# Patient Record
Sex: FEMALE | Race: WHITE | Employment: FULL TIME | URBAN - METROPOLITAN AREA
[De-identification: names, ages, dates, MRNs, and addresses within clinical notes are randomized per-mention and may not be internally consistent; named-entity substitution may affect disease eponyms.]

---

## 2017-01-07 NOTE — TELEPHONE ENCOUNTER
Patient needs a refill of the following  Requested Prescriptions     Pending Prescriptions Disp Refills    Azelastine (ASTEPRO) 0.15 % (205.5 mcg) nasal spray 1 Bottle 12     Si Sprays by Both Nostrils route two (2) times a day.

## 2017-01-08 RX ORDER — AZELASTINE HCL 205.5 UG/1
2 SPRAY NASAL 2 TIMES DAILY
Qty: 1 BOTTLE | Refills: 12 | Status: SHIPPED | OUTPATIENT
Start: 2017-01-08 | End: 2018-01-26 | Stop reason: SDUPTHER

## 2017-01-23 ENCOUNTER — OFFICE VISIT (OUTPATIENT)
Dept: FAMILY MEDICINE CLINIC | Age: 57
End: 2017-01-23

## 2017-01-23 VITALS
HEART RATE: 77 BPM | TEMPERATURE: 97.6 F | HEIGHT: 65 IN | WEIGHT: 146 LBS | DIASTOLIC BLOOD PRESSURE: 83 MMHG | BODY MASS INDEX: 24.32 KG/M2 | SYSTOLIC BLOOD PRESSURE: 130 MMHG | RESPIRATION RATE: 16 BRPM | OXYGEN SATURATION: 100 %

## 2017-01-23 DIAGNOSIS — J45.20 MILD INTERMITTENT ASTHMA WITHOUT COMPLICATION: ICD-10-CM

## 2017-01-23 DIAGNOSIS — R73.01 IMPAIRED FASTING GLUCOSE: ICD-10-CM

## 2017-01-23 DIAGNOSIS — K21.9 GASTROESOPHAGEAL REFLUX DISEASE, ESOPHAGITIS PRESENCE NOT SPECIFIED: ICD-10-CM

## 2017-01-23 DIAGNOSIS — Z00.00 PREVENTATIVE HEALTH CARE: Primary | ICD-10-CM

## 2017-01-23 DIAGNOSIS — E03.9 HYPOTHYROIDISM, ADULT: ICD-10-CM

## 2017-01-23 DIAGNOSIS — E78.00 PURE HYPERCHOLESTEROLEMIA: ICD-10-CM

## 2017-01-23 DIAGNOSIS — K22.5 ZENKER DIVERTICULUM: ICD-10-CM

## 2017-01-23 DIAGNOSIS — R63.5 WEIGHT GAIN: ICD-10-CM

## 2017-01-23 DIAGNOSIS — H61.91 SKIN LESION OF RIGHT EAR: ICD-10-CM

## 2017-01-23 RX ORDER — RANITIDINE 150 MG/1
150 TABLET, FILM COATED ORAL 2 TIMES DAILY
Qty: 180 TAB | Refills: 3 | Status: SHIPPED | OUTPATIENT
Start: 2017-01-23

## 2017-01-23 RX ORDER — MONTELUKAST SODIUM 10 MG/1
10 TABLET ORAL
Qty: 90 TAB | Refills: 4 | Status: SHIPPED | OUTPATIENT
Start: 2017-01-23 | End: 2018-01-23 | Stop reason: SDUPTHER

## 2017-01-23 NOTE — PROGRESS NOTES
[address]  [phone]             Date of visit: 1/23/2017   Subjective:      History obtained from:  the patient.   Erin Ness is a 55 y.o. female who presents today for physical and f/u chronic problem    Does think the increased thyroid dose has helped her energy level  However still having a hard time keeping her weight down  Of course was eating more desserts around the holidays  In the past could stay under 140 when she ate her usual diet  Is walking every day  Sometimes plays tennis or pickle ball  Doing some balance, stretching  No sugary drinks, only fruit juice 2x per week  Likes potatoes, white rice, some bread    Skin lesion behind right ear she wants me to check    Still on atorvastatin but would like to try off it  Not sure of cholesterol numbers before med, was over 200  Runs in the family  Despite eating well, her cholesterol would not come down    Rarely needs albuterol, attacks seemed to be due to living in [place], polluted air    Uses omeprazole pretty much every day, uses either for symptoms or before she eats something that will bring on reflux symptoms  Has not tried zantac  Trying to put off stretching procedure for zenker diverticulum    Coffee in am but no other caffeine usually    Patient Active Problem List    Diagnosis Date Noted    Hyperlipidemia 07/29/2015     Priority: 1 - One    Hypothyroidism, adult 07/29/2015     Priority: 1 - One    Impaired fasting glucose 07/29/2015     Priority: 1 - One    Zenker diverticulum 07/29/2015     Priority: 2 - Two    Asthma, mild intermittent 07/29/2015     Priority: 2 - Two    Preventative health care 11/30/2015    H/O malaria 09/09/2015    GERD (gastroesophageal reflux disease) 07/29/2015    Ovarian cancer ([facility] [address] 07/29/2015    Allergic rhinitis 07/29/2015    Family history of hemochromatosis 07/29/2015    Family history of breast cancer 07/29/2015    Family history of melanoma 07/29/2015    Diverticulum of esophagus, acquired 03/31/2015     Current Outpatient Prescriptions   Medication Sig Dispense Refill    montelukast (SINGULAIR) 10 mg tablet Take 1 Tab by mouth nightly. 90 Tab 4    raNITIdine (ZANTAC) 150 mg tablet Take 1 Tab by mouth two (2) times a day. For GERD 180 Tab 3    Azelastine (ASTEPRO) 0.15 % (205.5 mcg) nasal spray 2 Sprays by Both Nostrils route two (2) times a day. 1 Bottle 12    atorvastatin (LIPITOR) 10 mg tablet TAKE ONE TABLET BY MOUTH AT BEDTIME 30 Tab 11    levothyroxine (SYNTHROID) 112 mcg tablet Take 1 Tab by mouth Daily (before breakfast). Increased dose 90 Tab 1    fluticasone (FLONASE) 50 mcg/actuation nasal spray 2 Sprays by Both Nostrils route daily. 1 Bottle     albuterol (PROVENTIL) 5 mg/mL nebulizer solution 0.5 mL by Nebulization route every four (4) hours as needed for Wheezing. 0.5 mL 0    albuterol (PROVENTIL HFA, VENTOLIN HFA, PROAIR HFA) 90 mcg/actuation inhaler Take 2 Puffs by inhalation every four (4) hours as needed for Wheezing. 1 Inhaler 3    MULTIVITAMIN PO Take  by mouth daily.  GLUCOSAMINE HCL PO Take  by mouth daily.        No Known Allergies  Past Medical History   Diagnosis Date    Allergic rhinitis     Asthma     Cancer ([facility] [address] 2003     OVARIAN, no recurrence; s/p 3 rounds chemo     GERD (gastroesophageal reflux disease)     Malaria     Thyroid disease      Past Surgical History   Procedure Laterality Date    Hx orthopaedic  2002     FX L ANKLE     Hx gyn  2003     REMOVAL OF TUMOR    Hx hysterectomy  2005     bleeding fibroids    Hx appendectomy      Hx endoscopy  2015    Hx colonoscopy  age 53     normal     Family History   Problem Relation Age of Onset    Breast Cancer Mother 79     also melanoma; mom had negative genetic testing    Liver Disease Father      hemochromatosis    Hypertension Brother     Stomach Cancer Paternal Grandfather     Anesth Problems Neg Hx      Social History   Substance Use Topics    Smoking status: Never Smoker    Smokeless tobacco: Never Used    Alcohol use Yes      Comment: RARE      Social History     Social History Narrative    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 General: stated age, well developed, well nourished and in NAD  Neck: supple, symmetrical, trachea midline, no adenopathy and thyroid: not enlarged, symmetric, no tenderness/mass/nodules  Lungs:  clear to auscultation w/o rales, rhonchi, wheezes w/normal effort and no use of accessory muscles of respiration   Heart: regular rate and rhythm, S1, S2 normal, no murmur, click, rub or gallop  Abdomen: soft, nontender  Ext:  No edema noted.    Lymph: no cervical adenopathy appreciated  Skin:  Normal. and no rash or abnormalities other than 8mm light brown stuck on round flat papule behind right ear c/w seborrheic keratosis  Psych: alert and oriented to person, place, time and situation and Speech: appropriate quality, quantity and organization of sentences     Lab Results   Component Value Date/Time    Hemoglobin A1c 6.2 09/29/2016 04:43 PM     Lab Results   Component Value Date/Time    Cholesterol, total 202 11/30/2015 04:08 PM    HDL Cholesterol 71 11/30/2015 04:08 PM    LDL, calculated 94 11/30/2015 04:08 PM    VLDL, calculated 37 11/30/2015 04:08 PM    Triglyceride 186 11/30/2015 04:08 PM     Lab Results   Component Value Date/Time    Sodium 142 09/29/2016 04:43 PM    Potassium 5.1 09/29/2016 04:43 PM    Chloride 98 09/29/2016 04:43 PM    CO2 23 09/29/2016 04:43 PM    Anion gap 5 03/25/2015 11:25 AM    Glucose 93 09/29/2016 04:43 PM    BUN 12 09/29/2016 04:43 PM    Creatinine 0.68 09/29/2016 04:43 PM    BUN/Creatinine ratio 18 09/29/2016 04:43 PM    GFR est  09/29/2016 04:43 PM    GFR est non-AA 99 09/29/2016 04:43 PM    Calcium 10.0 09/29/2016 04:43 PM    Bilirubin, total 0.2 09/29/2016 04:43 PM    ALT 17 09/29/2016 04:43 PM    AST 14 09/29/2016 04:43 PM    Alk. phosphatase 65 09/29/2016 04:43 PM    Protein, total 6.9 09/29/2016 04:43 PM    Albumin 4.8 09/29/2016 04:43 PM    A-G Ratio 2.3 09/29/2016 04:43 PM     Lab Results   Component Value Date/Time    WBC 10.4 09/29/2016 04:43 PM    HGB 13.2 09/29/2016 04:43 PM    HCT 39.3 09/29/2016 04:43 PM    PLATELET 061 46/50/0435 04:43 PM    MCV 95 09/29/2016 04:43 PM     Lab Results   Component Value Date/Time    TSH 3.900 09/29/2016 04:43 PM       Assessment/Plan:       ICD-10-CM ICD-9-CM    1. Preventative health care Z00.00 V70.0    2. Hypothyroidism, adult E03.9 244.9 TSH 3RD GENERATION    feeling more energy on new dose, check tsh today   3. Pure hypercholesterolemia E78.00 272.0     will try off lipitor before next visit so we can see what cholesterol is without it; plan to use new risk calculator to determine need for statin   4. Gastroesophageal reflux disease, esophagitis presence not specified K21.9 530.81     going to try zantac instead of prn (most day) omeprazole, will let me know if that doesn't work, seeking to lower risk of side effects   5. Skin lesion of right ear L98.9 709.9     seborrheic keratosis, she was reassured   6. Impaired fasting glucose R73.01 790.21     plan to recheck next visit   7. Zenker diverticulum K22.5 530.6     swallowing ok now but expects to need another procedure at some point   8. Weight gain R63.5 783.1     she is working hard on this, BMI technically normal but would like to lose 8-10lb, discussed diet/exercise   9.  Mild intermittent asthma without complication U64.75 753.41     not a problem since she left [place]       Orders Placed This Encounter    TSH 3RD GENERATION    montelukast (SINGULAIR) 10 mg tablet    raNITIdine (ZANTAC) 150 mg tablet       PREVENTIVE  Mammogram: normal 12/16 (3D)  Pap smear:  Always normal and s/p hysterectomy (benign)  Lipid panel:  Good on lipitor 11/15, going to try off it and recheck next visit  Cardiac:  EKG in chart  Bone density:  Due at 66  Colonoscopy:  Normal at age 53  HIV: neg 11/15  Hep C: neg 11/15  Immunizations: has had numerous for travel; pneumovax 2015  Discussed the diagnosis and plan and she expressed understanding. Follow-up Disposition:  Return in about 6 months (around 7/23/2017).     Rabia Mckee MD

## 2017-01-23 NOTE — MR AVS SNAPSHOT
Visit Information Date & Time Provider Department Dept. Phone Encounter #  
 1/23/2017  8:15 AM Geneva Denny, Shani Porras [phone] [id number] Follow-up Instructions Return in about 6 months (around 7/23/2017). Upcoming Health Maintenance Date Due Pneumococcal 19-64 Highest Risk (2 of 3 - PCV13) 9/29/2017 BREAST CANCER SCRN MAMMOGRAM 12/6/2018 DTaP/Tdap/Td series (2 - Td) 11/26/2019 COLONOSCOPY 7/16/2020 Allergies as of 1/23/2017  Review Complete On: 1/23/2017 By: Geneva Denny MD  
 No Known Allergies Current Immunizations  Reviewed on 9/29/2016 Name Date Cholera Vaccine 8/11/1997 Hep A Vaccine (Adult) 8/11/1997 Hep B Vaccine 10/11/2001 Influenza Vaccine (Quad) PF 9/29/2016 Meningococcal (MCV4P) Vaccine 4/4/2013 OPV 8/11/1997 Pneumococcal Polysaccharide (PPSV-23) 9/29/2016 Tdap 11/26/2009 Typhoid Vaccine, Parenteral, Acetone-Killed, Dried ([employer]) 5/9/2014, 3/10/2010 Yellow Fever Vaccine 7/25/2006, 12/2/1996 Not reviewed this visit You Were Diagnosed With   
  
 Codes Comments Preventative health care    -  Primary ICD-10-CM: Z00.00 ICD-9-CM: V70.0 Hypothyroidism, adult     ICD-10-CM: E03.9 ICD-9-CM: 244.9 Pure hypercholesterolemia     ICD-10-CM: E78.00 ICD-9-CM: 272.0 Skin lesion of right ear     ICD-10-CM: L98.9 ICD-9-CM: 709.9 Impaired fasting glucose     ICD-10-CM: R73.01 
ICD-9-CM: 790.21 Zenker diverticulum     ICD-10-CM: K22.5 ICD-9-CM: 530.6 Vitals BP Pulse Temp Resp Height(growth percentile) Weight(growth percentile) 130/83 77 97.6 °F (36.4 °C) (Oral) 16 5' 5\" (1.651 m) 146 lb (66.2 kg) SpO2 BMI OB Status Smoking Status 100% 24.3 kg/m2 Hysterectomy Never Smoker Vitals History BMI and BSA Data Body Mass Index Body Surface Area  
 24.3 kg/m 2 1.74 m 2 Preferred Pharmacy Pharmacy Name Phone [address] Hamzah [facility]ebbles Velasquez [phone] Your Updated Medication List  
  
   
This list is accurate as of: 1/23/17  8:47 AM.  Always use your most recent med list.  
  
  
  
  
 * albuterol 5 mg/mL nebulizer solution Commonly known as:  PROVENTIL  
0.5 mL by Nebulization route every four (4) hours as needed for Wheezing. * albuterol 90 mcg/actuation inhaler Commonly known as:  PROVENTIL HFA, VENTOLIN HFA, PROAIR HFA Take 2 Puffs by inhalation every four (4) hours as needed for Wheezing. atorvastatin 10 mg tablet Commonly known as:  LIPITOR  
TAKE ONE TABLET BY MOUTH AT BEDTIME Azelastine 0.15 % (205.5 mcg) nasal spray Commonly known as:  ASTEPRO  
2 Sprays by Both Nostrils route two (2) times a day. fluticasone 50 mcg/actuation nasal spray Commonly known as:  Hameed Blizzard 2 Sprays by Both Nostrils route daily. GLUCOSAMINE HCL PO Take  by mouth daily. levothyroxine 112 mcg tablet Commonly known as:  SYNTHROID Take 1 Tab by mouth Daily (before breakfast). Increased dose  
  
 montelukast 10 mg tablet Commonly known as:  SINGULAIR Take 1 Tab by mouth nightly. MULTIVITAMIN PO Take  by mouth daily. raNITIdine 150 mg tablet Commonly known as:  ZANTAC Take 1 Tab by mouth two (2) times a day. For GERD * Notice: This list has 2 medication(s) that are the same as other medications prescribed for you. Read the directions carefully, and ask your doctor or other care provider to review them with you. Prescriptions Sent to Pharmacy Refills  
 montelukast (SINGULAIR) 10 mg tablet 4 Sig: Take 1 Tab by mouth nightly. Class: Normal  
 Pharmacy: [address] P[facility]bles [facility]  #: [phone] Route: Oral  
 raNITIdine (ZANTAC) 150 mg tablet 3 Sig: Take 1 Tab by mouth two (2) times a day. For GERD  Class: Normal  
 Pharmacy: Jessica Naik [address] [place] [address] [place]u De Paume, [place] [facility] [phone] Route: Oral  
  
Follow-up Instructions Return in about 6 months (around 7/23/2017). Patient Instructions Well Visit, Women 48 to 72: Care Instructions Your Care Instructions Physical exams can help you stay healthy. Your doctor has checked your overall health and may have suggested ways to take good care of yourself. He or she also may have recommended tests. At home, you can help prevent illness with healthy eating, regular exercise, and other steps. Follow-up care is a key part of your treatment and safety. Be sure to make and go to all appointments, and call your doctor if you are having problems. It's also a good idea to know your test results and keep a list of the medicines you take. How can you care for yourself at home? · Reach and stay at a healthy weight. This will lower your risk for many problems, such as obesity, diabetes, heart disease, and high blood pressure. · Get at least 30 minutes of exercise on most days of the week. Walking is a good choice. You also may want to do other activities, such as running, swimming, cycling, or playing tennis or team sports. · Do not smoke. Smoking can make health problems worse. If you need help quitting, talk to your doctor about stop-smoking programs and medicines. These can increase your chances of quitting for good. · Protect your skin from too much sun. When you're outdoors from 10 a.m. to 4 p.m., stay in the shade or cover up with clothing and a hat with a wide brim. Wear sunglasses that block UV rays. Even when it's cloudy, put broad-spectrum sunscreen (SPF 30 or higher) on any exposed skin. · See a dentist one or two times a year for checkups and to have your teeth cleaned. · Wear a seat belt in the car. · Limit alcohol to 1 drink a day. Too much alcohol can cause health problems. Follow your doctor's advice about when to have certain tests. These tests can spot problems early. · Cholesterol. Your doctor will tell you how often to have this done based on your age, family history, or other things that can increase your risk for heart attack and stroke. · Blood pressure. Have your blood pressure checked during a routine doctor visit. Your doctor will tell you how often to check your blood pressure based on your age, your blood pressure results, and other factors. · Mammogram. Ask your doctor how often you should have a mammogram, which is an X-ray of your breasts. A mammogram can spot breast cancer before it can be felt and when it is easiest to treat. · Pap test and pelvic exam. Ask your doctor how often you should have a Pap test. You may not need to have a Pap test as often as you used to. · Vision. Have your eyes checked every year or two or as often as your doctor suggests. Some experts recommend that you have yearly exams for glaucoma and other age-related eye problems starting at age 48. · Hearing. Tell your doctor if you notice any change in your hearing. You can have tests to find out how well you hear. · Diabetes. Ask your doctor whether you should have tests for diabetes. · Colon cancer. You should begin tests for colon cancer at age 48. You may have one of several tests. Your doctor will tell you how often to have tests based on your age and risk. Risks include whether you already had a precancerous polyp removed from your colon or whether your parents, sisters and brothers, or children have had colon cancer. · Thyroid disease. Talk to your doctor about whether to have your thyroid checked as part of a regular physical exam. Women have an increased chance of a thyroid problem. · Osteoporosis. You should begin tests for bone density at age 72.  If you are younger than 72, ask your doctor whether you have factors that may increase your risk for this disease. You may want to have this test before age 72. · Heart attack and stroke risk. At least every 4 to 6 years, you should have your risk for heart attack and stroke assessed. Your doctor uses factors such as your age, blood pressure, cholesterol, and whether you smoke or have diabetes to show what your risk for a heart attack or stroke is over the next 10 years. When should you call for help? Watch closely for changes in your health, and be sure to contact your doctor if you have any problems or symptoms that concern you. Where can you learn more? Go to http://yuko-dash.info/. Enter B820 in the search box to learn more about \"Well Visit, Women 50 to 72: Care Instructions. \" Current as of: July 19, 2016 Content Version: 11.1 © 5831-3197 Healthwise, Incorporated. Care instructions adapted under license by [employer] (which disclaims liability or warranty for this information). If you have questions about a medical condition or this instruction, always ask your healthcare professional. [place] [address] any warranty or liability for your use of this information. Introducing [address]! Dear Dinah Arenas: Thank you for requesting a [employer] account. Our records indicate that you already have an active [employer] account. You can access your account anytime at [email or web address] Did you know that you can access your hospital and ER discharge instructions at any time in [employer]? You can also review all of your test results from your hospital stay or ER visit. Additional Information If you have questions, please visit the Frequently Asked Questions section of the [employer] website at [email or web address] Remember, [employer] is NOT to be used for urgent needs. For medical emergencies, dial 911. Now available from your iPhone and Android! Please provide this summary of care documentation to your next provider. Your primary care clinician is listed as Starr Henriquez. If you have any questions after today's visit, please call [phone].

## 2017-01-23 NOTE — PATIENT INSTRUCTIONS
Well Visit, Women 48 to 72: Care Instructions  Your Care Instructions  Physical exams can help you stay healthy. Your doctor has checked your overall health and may have suggested ways to take good care of yourself. He or she also may have recommended tests. At home, you can help prevent illness with healthy eating, regular exercise, and other steps. Follow-up care is a key part of your treatment and safety. Be sure to make and go to all appointments, and call your doctor if you are having problems. It's also a good idea to know your test results and keep a list of the medicines you take. How can you care for yourself at home? · Reach and stay at a healthy weight. This will lower your risk for many problems, such as obesity, diabetes, heart disease, and high blood pressure. · Get at least 30 minutes of exercise on most days of the week. Walking is a good choice. You also may want to do other activities, such as running, swimming, cycling, or playing tennis or team sports. · Do not smoke. Smoking can make health problems worse. If you need help quitting, talk to your doctor about stop-smoking programs and medicines. These can increase your chances of quitting for good. · Protect your skin from too much sun. When you're outdoors from 10 a.m. to 4 p.m., stay in the shade or cover up with clothing and a hat with a wide brim. Wear sunglasses that block UV rays. Even when it's cloudy, put broad-spectrum sunscreen (SPF 30 or higher) on any exposed skin. · See a dentist one or two times a year for checkups and to have your teeth cleaned. · Wear a seat belt in the car. · Limit alcohol to 1 drink a day. Too much alcohol can cause health problems. Follow your doctor's advice about when to have certain tests. These tests can spot problems early. · Cholesterol.  Your doctor will tell you how often to have this done based on your age, family history, or other things that can increase your risk for heart attack and stroke. · Blood pressure. Have your blood pressure checked during a routine doctor visit. Your doctor will tell you how often to check your blood pressure based on your age, your blood pressure results, and other factors. · Mammogram. Ask your doctor how often you should have a mammogram, which is an X-ray of your breasts. A mammogram can spot breast cancer before it can be felt and when it is easiest to treat. · Pap test and pelvic exam. Ask your doctor how often you should have a Pap test. You may not need to have a Pap test as often as you used to. · Vision. Have your eyes checked every year or two or as often as your doctor suggests. Some experts recommend that you have yearly exams for glaucoma and other age-related eye problems starting at age 48. · Hearing. Tell your doctor if you notice any change in your hearing. You can have tests to find out how well you hear. · Diabetes. Ask your doctor whether you should have tests for diabetes. · Colon cancer. You should begin tests for colon cancer at age 48. You may have one of several tests. Your doctor will tell you how often to have tests based on your age and risk. Risks include whether you already had a precancerous polyp removed from your colon or whether your parents, sisters and brothers, or children have had colon cancer. · Thyroid disease. Talk to your doctor about whether to have your thyroid checked as part of a regular physical exam. Women have an increased chance of a thyroid problem. · Osteoporosis. You should begin tests for bone density at age 72. If you are younger than 72, ask your doctor whether you have factors that may increase your risk for this disease. You may want to have this test before age 72. · Heart attack and stroke risk. At least every 4 to 6 years, you should have your risk for heart attack and stroke assessed.  Your doctor uses factors such as your age, blood pressure, cholesterol, and whether you smoke or have diabetes to show what your risk for a heart attack or stroke is over the next 10 years. When should you call for help? Watch closely for changes in your health, and be sure to contact your doctor if you have any problems or symptoms that concern you. Where can you learn more? Go to http://yuko-dash.info/. Enter Y227 in the search box to learn more about \"Well Visit, Women 50 to [place]: Care Instructions. \"  Current as of: July 19, 2016  Content Version: 11.1  © 4936-0139 [employer]. Care instructions adapted under license by [employer] (which disclaims liability or warranty for this information). If you have questions about a medical condition or this instruction, always ask your healthcare professional. [place] [address] any warranty or liability for your use of this information. My new location:  [facility].  Family Practice  [id number] [place] [address], [place], [address] [place] [address] Road  Phone: [phone]

## 2017-01-24 LAB — TSH SERPL DL<=0.005 MIU/L-ACNC: 2.42 UIU/ML (ref 0.45–4.5)

## 2017-06-30 ENCOUNTER — PATIENT MESSAGE (OUTPATIENT)
Dept: FAMILY MEDICINE CLINIC | Age: 57
End: 2017-06-30

## 2017-06-30 DIAGNOSIS — R13.19 ESOPHAGEAL DYSPHAGIA: ICD-10-CM

## 2017-06-30 DIAGNOSIS — K22.5 ZENKER DIVERTICULUM: Primary | ICD-10-CM

## 2017-07-03 RX ORDER — LEVOTHYROXINE SODIUM 112 UG/1
TABLET ORAL
Qty: 90 TAB | Refills: 0 | Status: SHIPPED | OUTPATIENT
Start: 2017-07-03 | End: 2017-10-13 | Stop reason: SDUPTHER

## 2017-07-03 RX ORDER — ALBUTEROL SULFATE 90 UG/1
2 AEROSOL, METERED RESPIRATORY (INHALATION)
Qty: 1 INHALER | Refills: 3 | Status: SHIPPED | OUTPATIENT
Start: 2017-07-03

## 2017-08-01 ENCOUNTER — OFFICE VISIT (OUTPATIENT)
Dept: FAMILY MEDICINE CLINIC | Age: 57
End: 2017-08-01

## 2017-08-01 VITALS
HEIGHT: 65 IN | WEIGHT: 144.4 LBS | TEMPERATURE: 98.6 F | BODY MASS INDEX: 24.06 KG/M2 | RESPIRATION RATE: 16 BRPM | OXYGEN SATURATION: 98 % | SYSTOLIC BLOOD PRESSURE: 114 MMHG | DIASTOLIC BLOOD PRESSURE: 70 MMHG | HEART RATE: 79 BPM

## 2017-08-01 DIAGNOSIS — E03.9 HYPOTHYROIDISM, ADULT: ICD-10-CM

## 2017-08-01 DIAGNOSIS — J01.90 ACUTE NON-RECURRENT SINUSITIS, UNSPECIFIED LOCATION: ICD-10-CM

## 2017-08-01 DIAGNOSIS — G89.29 CHRONIC NECK PAIN: ICD-10-CM

## 2017-08-01 DIAGNOSIS — R39.198 DIFFICULTY VOIDING: ICD-10-CM

## 2017-08-01 DIAGNOSIS — R73.01 IMPAIRED FASTING GLUCOSE: ICD-10-CM

## 2017-08-01 DIAGNOSIS — E78.00 PURE HYPERCHOLESTEROLEMIA: Primary | ICD-10-CM

## 2017-08-01 DIAGNOSIS — M54.2 CHRONIC NECK PAIN: ICD-10-CM

## 2017-08-01 DIAGNOSIS — M54.9 ACUTE UPPER BACK PAIN: ICD-10-CM

## 2017-08-01 DIAGNOSIS — K22.5 ZENKER DIVERTICULUM: ICD-10-CM

## 2017-08-01 NOTE — MR AVS SNAPSHOT
Visit Information Date & Time Provider Department Dept. Phone Encounter #  
 8/1/2017  8:15 AM Kiarra Cole, [facility] [phone] [id number] Follow-up Instructions Return in about 6 months (around 2/1/2018) for preventive care visit. Upcoming Health Maintenance Date Due INFLUENZA AGE 9 TO ADULT 8/1/2017 Pneumococcal 19-64 Highest Risk (2 of 3 - PCV13) 9/29/2017 BREAST CANCER SCRN MAMMOGRAM 12/6/2018 DTaP/Tdap/Td series (2 - Td) 11/26/2019 COLONOSCOPY 7/16/2020 Allergies as of 8/1/2017  Review Complete On: 8/1/2017 By: Kiarra Cole MD  
 No Known Allergies Current Immunizations  Reviewed on 7/28/2017 Name Date Cholera Vaccine 8/11/1997 Hep A Vaccine (Adult) 8/11/1997 Hep B Vaccine 10/11/2001 Influenza Vaccine (Quad) PF 9/29/2016 Meningococcal (MCV4P) Vaccine 4/4/2013 OPV 8/11/1997 Pneumococcal Polysaccharide (PPSV-23) 9/29/2016 Tdap 11/26/2009 Typhoid Vaccine, Parenteral, Acetone-Killed, Dried ([place] ) 5/9/2014, 3/10/2010 Yellow Fever Vaccine 7/25/2006, 12/2/1996 Not reviewed this visit You Were Diagnosed With   
  
 Codes Comments Pure hypercholesterolemia    -  Primary ICD-10-CM: E78.00 ICD-9-CM: 272.0 Hypothyroidism, adult     ICD-10-CM: E03.9 ICD-9-CM: 244.9 Impaired fasting glucose     ICD-10-CM: R73.01 
ICD-9-CM: 790.21 Zenker diverticulum     ICD-10-CM: K22.5 ICD-9-CM: 530.6 Gastroesophageal reflux disease, esophagitis presence not specified     ICD-10-CM: K21.9 ICD-9-CM: 530.81 Chronic neck pain     ICD-10-CM: M54.2, G89.29 ICD-9-CM: 723.1, 338.29 Acute upper back pain     ICD-10-CM: M54.9 ICD-9-CM: 724.5, 338.19 Difficulty voiding     ICD-10-CM: R39.198 ICD-9-CM: 788.99 Vitals BP Pulse Temp Resp Height(growth percentile) Weight(growth percentile) 114/70 (BP 1 Location: Right arm, BP Patient Position: Sitting) 79 98.6 °F (37 °C) (Oral) 16 5' 5\" (1.651 m) 144 lb 6.4 oz (65.5 kg) SpO2 BMI OB Status Smoking Status 98% 24.03 kg/m2 Hysterectomy Never Smoker BMI and BSA Data Body Mass Index Body Surface Area 24.03 kg/m 2 1.73 m 2 Preferred Pharmacy Pharmacy Name Phone [facility] [address] [phone] Your Updated Medication List  
  
   
This list is accurate as of: 8/1/17  9:00 AM.  Always use your most recent med list.  
  
  
  
  
 * albuterol 5 mg/mL nebulizer solution Commonly known as:  PROVENTIL  
0.5 mL by Nebulization route every four (4) hours as needed for Wheezing. * albuterol 90 mcg/actuation inhaler Commonly known as:  PROVENTIL HFA, VENTOLIN HFA, PROAIR HFA Take 2 Puffs by inhalation every four (4) hours as needed for Wheezing. Azelastine 0.15 % (205.5 mcg) nasal spray Commonly known as:  ASTEPRO  
2 Sprays by Both Nostrils route two (2) times a day. fluticasone 50 mcg/actuation nasal spray Commonly known as:  Ericka Keri 2 Sprays by Both Nostrils route daily. GLUCOSAMINE HCL PO Take  by mouth daily. levothyroxine 112 mcg tablet Commonly known as:  SYNTHROID  
TAKE ONE TABLET BY MOUTH EVERY MORNING BEFORE BREAKFAST  
  
 montelukast 10 mg tablet Commonly known as:  SINGULAIR Take 1 Tab by mouth nightly. MULTIVITAMIN PO Take  by mouth daily. raNITIdine 150 mg tablet Commonly known as:  ZANTAC Take 1 Tab by mouth two (2) times a day. For GERD * Notice: This list has 2 medication(s) that are the same as other medications prescribed for you. Read the directions carefully, and ask your doctor or other care provider to review them with you. We Performed the Following CULTURE, URINE [id number] CPT(R)] HEMOGLOBIN A1C WITH EAG [60754 CPT(R)] LIPID PANEL [64744 CPT(R)] METABOLIC PANEL, COMPREHENSIVE [40221 CPT(R)] REFERRAL TO CHIROPRACTIC [REF16 Custom] Comments:  
 Please evaluate patient for neck, upper back pain. REFERRAL TO UROGYNECOLOGY [id number] CPT(R)] Comments:  
 Please evaluate patient for difficulty voiding. TSH 3RD GENERATION [06503 CPT(R)] URINALYSIS W/ RFLX MICROSCOPIC [14994 CPT(R)] Follow-up Instructions Return in about 6 months (around 2/1/2018) for preventive care visit. Referral Information Referral ID Referred By Referred To  
  
 [id number] Olvin Ramireznish Ramos, [address] [place] [address] Rashmi Soto  Phone: [phone] Fax: [phone] Visits Status Start Date End Date 1 New Request 8/1/17 8/1/18 If your referral has a status of pending review or denied, additional information will be sent to support the outcome of this decision. Referral ID Referred By Referred To  
 [id number] [facility] [address] Visits Status Start Date End Date 1 New Request 8/1/17 8/1/18 If your referral has a status of pending review or denied, additional information will be sent to support the outcome of this decision. Patient Instructions Hypothyroidism: Care Instructions Your Care Instructions You have hypothyroidism, which means that your body is not making enough thyroid hormone. This hormone helps your body use energy. If your thyroid level is low, you may feel tired, be constipated, have an increase in your blood pressure, or have dry skin or memory problems. You may also get cold easily, even when it is warm. Women with low thyroid levels may have heavy menstrual periods. A blood test to find your thyroid-stimulating hormone (TSH) level is used to check for hypothyroidism. A high TSH level may mean that you have low thyroid.  When your body is not making enough thyroid hormone, TSH levels rise in an effort to make the body produce more. The treatment for hypothyroidism is to take thyroid hormone pills. You should start to feel better in 1 to 2 weeks. But it can take several months to see changes in the TSH level. You will need regular visits with your doctor to make sure you have the right dose of medicine. Most people need treatment for the rest of their lives. You will need to see your doctor regularly to have blood tests and to make sure you are doing well. Follow-up care is a key part of your treatment and safety. Be sure to make and go to all appointments, and call your doctor if you are having problems. Its also a good idea to know your test results and keep a list of the medicines you take. How can you care for yourself at home? · Take your thyroid hormone medicine exactly as prescribed. Call your doctor if you think you are having a problem with your medicine. Most people do not have side effects if they take the right amount of medicine regularly. ¨ Take the medicine 30 minutes before breakfast, and do not take it with calcium, vitamins, or iron. ¨ Do not take extra doses of your thyroid medicine. It will not help you get better any faster, and it may cause side effects. ¨ If you forget to take a dose, do NOT take a double dose of medicine. Take your usual dose the next day. · Tell your doctor about all prescription, herbal, or over-the-counter products you take. · Take care of yourself. Eat a healthy diet, get enough sleep, and get regular exercise. When should you call for help? Call 911 anytime you think you may need emergency care. For example, call if: 
· You passed out (lost consciousness). · You have severe trouble breathing. · You have a very slow heartbeat (less than 60 beats a minute). · You have a low body temperature (95°F or below). Call your doctor now or seek immediate medical care if: 
· You feel tired, sluggish, or weak. · You have trouble remembering things or concentrating. · You do not begin to feel better 2 weeks after starting your medicine. Watch closely for changes in your health, and be sure to contact your doctor if you have any problems. Where can you learn more? Go to http://yuko-dash.info/. Enter Y449 in the search box to learn more about \"Hypothyroidism: Care Instructions. \" Current as of: July 28, 2016 Content Version: 11.3 © 9390-8917 Inspire. Care instructions adapted under license by [employer] (which disclaims liability or warranty for this information). If you have questions about a medical condition or this instruction, always ask your healthcare professional. [address] any warranty or liability for your use of this information. Introducing [facility]! Dear Jevon Flynn: Thank you for requesting a Bukupe account. Our records indicate that you already have an active Bukupe account. You can access your account anytime at [email or web address]. TimePoints/"[email or web address] Did you know that you can access your hospital and ER discharge instructions at any time in Bukupe? You can also review all of your test results from your hospital stay or ER visit. Additional Information If you have questions, please visit the Frequently Asked Questions section of the Bukupe website at [email or web address][email or web address] Remember, Bukupe is NOT to be used for urgent needs. For medical emergencies, dial 911. Now available from your iPhone and Android! Please provide this summary of care documentation to your next provider. Your primary care clinician is listed as Madonna Schneider. If you have any questions after today's visit, please call [phone].

## 2017-08-01 NOTE — PATIENT INSTRUCTIONS
Hypothyroidism: Care Instructions  Your Care Instructions  You have hypothyroidism, which means that your body is not making enough thyroid hormone. This hormone helps your body use energy. If your thyroid level is low, you may feel tired, be constipated, have an increase in your blood pressure, or have dry skin or memory problems. You may also get cold easily, even when it is warm. Women with low thyroid levels may have heavy menstrual periods. A blood test to find your thyroid-stimulating hormone (TSH) level is used to check for hypothyroidism. A high TSH level may mean that you have low thyroid. When your body is not making enough thyroid hormone, TSH levels rise in an effort to make the body produce more. The treatment for hypothyroidism is to take thyroid hormone pills. You should start to feel better in 1 to 2 weeks. But it can take several months to see changes in the TSH level. You will need regular visits with your doctor to make sure you have the right dose of medicine. Most people need treatment for the rest of their lives. You will need to see your doctor regularly to have blood tests and to make sure you are doing well. Follow-up care is a key part of your treatment and safety. Be sure to make and go to all appointments, and call your doctor if you are having problems. Its also a good idea to know your test results and keep a list of the medicines you take. How can you care for yourself at home? · Take your thyroid hormone medicine exactly as prescribed. Call your doctor if you think you are having a problem with your medicine. Most people do not have side effects if they take the right amount of medicine regularly. ¨ Take the medicine 30 minutes before breakfast, and do not take it with calcium, vitamins, or iron. ¨ Do not take extra doses of your thyroid medicine. It will not help you get better any faster, and it may cause side effects.   ¨ If you forget to take a dose, do NOT take a double dose of medicine. Take your usual dose the next day. · Tell your doctor about all prescription, herbal, or over-the-counter products you take. · Take care of yourself. Eat a healthy diet, get enough sleep, and get regular exercise. When should you call for help? Call 911 anytime you think you may need emergency care. For example, call if:  · You passed out (lost consciousness). · You have severe trouble breathing. · You have a very slow heartbeat (less than 60 beats a minute). · You have a low body temperature (95°F or below). Call your doctor now or seek immediate medical care if:  · You feel tired, sluggish, or weak. · You have trouble remembering things or concentrating. · You do not begin to feel better 2 weeks after starting your medicine. Watch closely for changes in your health, and be sure to contact your doctor if you have any problems. Where can you learn more? Go to http://yuko-dash.info/. Enter Y213 in the search box to learn more about \"Hypothyroidism: Care Instructions. \"  Current as of: July 28, 2016  Content Version: 11.3  © 4642-9974 [employer]. Care instructions adapted under license by [employer] (which disclaims liability or warranty for this information). If you have questions about a medical condition or this instruction, always ask your healthcare professional. [address] any warranty or liability for your use of this information.

## 2017-08-02 LAB
ALBUMIN SERPL-MCNC: 4.7 G/DL (ref 3.5–5.5)
ALBUMIN/GLOB SERPL: 1.9 {RATIO} (ref 1.2–2.2)
ALP SERPL-CCNC: 71 IU/L (ref 39–117)
ALT SERPL-CCNC: 12 IU/L (ref 0–32)
APPEARANCE UR: CLEAR
AST SERPL-CCNC: 15 IU/L (ref 0–40)
BACTERIA UR CULT: NO GROWTH
BILIRUB SERPL-MCNC: 0.4 MG/DL (ref 0–1.2)
BILIRUB UR QL STRIP: NEGATIVE
BUN SERPL-MCNC: 11 MG/DL (ref 6–24)
BUN/CREAT SERPL: 14 (ref 9–23)
CALCIUM SERPL-MCNC: 10.2 MG/DL (ref 8.7–10.2)
CHLORIDE SERPL-SCNC: 99 MMOL/L (ref 96–106)
CHOLEST SERPL-MCNC: 273 MG/DL (ref 100–199)
CO2 SERPL-SCNC: 25 MMOL/L (ref 18–29)
COLOR UR: YELLOW
CREAT SERPL-MCNC: 0.77 MG/DL (ref 0.57–1)
EST. AVERAGE GLUCOSE BLD GHB EST-MCNC: 120 MG/DL
GLOBULIN SER CALC-MCNC: 2.5 G/DL (ref 1.5–4.5)
GLUCOSE SERPL-MCNC: 121 MG/DL (ref 65–99)
GLUCOSE UR QL: NEGATIVE
HBA1C MFR BLD: 5.8 % (ref 4.8–5.6)
HDLC SERPL-MCNC: 62 MG/DL
HGB UR QL STRIP: NEGATIVE
INTERPRETATION, 910389: NORMAL
KETONES UR QL STRIP: NEGATIVE
LDLC SERPL CALC-MCNC: 161 MG/DL (ref 0–99)
LEUKOCYTE ESTERASE UR QL STRIP: NEGATIVE
MICRO URNS: ABNORMAL
NITRITE UR QL STRIP: NEGATIVE
PH UR STRIP: 6.5 [PH] (ref 5–7.5)
POTASSIUM SERPL-SCNC: 5.3 MMOL/L (ref 3.5–5.2)
PROT SERPL-MCNC: 7.2 G/DL (ref 6–8.5)
PROT UR QL STRIP: NEGATIVE
SODIUM SERPL-SCNC: 141 MMOL/L (ref 134–144)
SP GR UR: <=1.005 (ref 1–1.03)
TRIGL SERPL-MCNC: 250 MG/DL (ref 0–149)
TSH SERPL DL<=0.005 MIU/L-ACNC: 2.28 UIU/ML (ref 0.45–4.5)
UROBILINOGEN UR STRIP-MCNC: 0.2 MG/DL (ref 0.2–1)
VLDLC SERPL CALC-MCNC: 50 MG/DL (ref 5–40)

## 2017-08-16 ENCOUNTER — TELEPHONE (OUTPATIENT)
Dept: FAMILY MEDICINE CLINIC | Age: 57
End: 2017-08-16

## 2017-08-16 NOTE — TELEPHONE ENCOUNTER
Referral Request Telephone Call      Insurance Name:     Felix aVsquez [facility])     Insurance ID:  [id number]   Specialist Name: Dr. Judy Blair of Specialty:  Gastrointestinal Specialist    Address of Specialist:  [address]   Phone/Fax Number of Specialist: [phone]   XVX  [phone]   Diagnosis: Diverticulum   Appointment Date: 8-21- @ 9:30   NPI    Tax ID

## 2017-08-25 ENCOUNTER — HOSPITAL ENCOUNTER (OUTPATIENT)
Dept: GENERAL RADIOLOGY | Age: 57
Discharge: HOME | End: 2017-08-25
Attending: INTERNAL MEDICINE
Payer: COMMERCIAL

## 2017-08-25 DIAGNOSIS — K21.9 GERD (GASTROESOPHAGEAL REFLUX DISEASE): ICD-10-CM

## 2017-08-25 PROCEDURE — 74220 X-RAY XM ESOPHAGUS 1CNTRST: CPT

## 2017-09-06 ENCOUNTER — TELEPHONE (OUTPATIENT)
Dept: FAMILY MEDICINE CLINIC | Age: 57
End: 2017-09-06

## 2017-09-06 NOTE — TELEPHONE ENCOUNTER
Valentin Mackey is calling from Dr. Layne Hatchet office stating that the procedure code on the referral needs to be changed. She states that the patient is new to them and the procedure code should be [id number]. Patient has an appointment 9/11/2017.

## 2017-11-04 ENCOUNTER — TELEPHONE (OUTPATIENT)
Dept: FAMILY MEDICINE CLINIC | Age: 57
End: 2017-11-04

## 2017-11-04 NOTE — TELEPHONE ENCOUNTER
Called and left praneeth (she had sent me [employer] message asking me to call). Told her in my message I would check to see if we are accepting the [facility] plan that she is likely to have next year.

## 2017-11-06 ENCOUNTER — PATIENT MESSAGE (OUTPATIENT)
Dept: FAMILY MEDICINE CLINIC | Age: 57
End: 2017-11-06

## 2017-11-06 NOTE — TELEPHONE ENCOUNTER
From: Calvin Gonzalez  To: Randall Curry MD  Sent: 11/6/2017 11:16 AM EST  Subject: Visit Kal Anderson. Thanks for your call. I was out of town. Destin Margarito, my cellphone is [phone]. Will wait to hear if you are accepting [employer]. Hoping to keep you as my PCP!  Thanks, Dayna Valdovinos

## 2018-01-02 ENCOUNTER — TELEPHONE (OUTPATIENT)
Dept: FAMILY MEDICINE CLINIC | Age: 58
End: 2018-01-02

## 2018-01-02 NOTE — TELEPHONE ENCOUNTER
Patient was calling because she had some question regarding her medication. She is currently taking Mucinex with other doctor prescribed medications. Best contact number is [phone].

## 2018-01-02 NOTE — TELEPHONE ENCOUNTER
Called this phone # and the person that answered the call said that she had called and it was about the same thing but it wasn't this pt. Disregard this message.

## 2018-01-23 RX ORDER — MONTELUKAST SODIUM 10 MG/1
10 TABLET ORAL
Qty: 90 TAB | Refills: 3 | Status: SHIPPED | OUTPATIENT
Start: 2018-01-23

## 2018-01-23 NOTE — TELEPHONE ENCOUNTER
From: Nida Ta  To: Stephanie Ralph MD  Sent: 1/23/2018 10:07 AM EST  Subject: Medication Renewal Request    Original authorizing provider: MD Nida Sigala would like a refill of the following medications:  montelukast (SINGULAIR) 10 mg tablet [ITNSFXM Noelle Gilford, MD]    Preferred pharmacy: [address]    Comment:  Dr. Imani De La Cruz, I will need one more 90 day refill on this med before I can get in to see my PCP under [address] Can you authorize this refill for me? Thank you, Nida Ta    Last seen 8/1/17, will need to change PCP b/c of insurance change.

## 2018-01-26 RX ORDER — AZELASTINE HCL 205.5 UG/1
SPRAY NASAL
Qty: 30 SPRAY | Refills: 11 | Status: SHIPPED | OUTPATIENT
Start: 2018-01-26

## 2018-10-26 NOTE — LETTER
8/1/2017 10:01 AM 
 
RE:    Patel Grit  
[address] [place] [facility] [address] Thank you for agreeing to see Toma Prasad I am referring my patient to you for evaluation of feeling like she can't void completely unless she bears down very hard. This has been happening for 2-3 months but is new for her. We are checking urinalysis and culture, but she has no other urinary symptoms to suggest infection. She has a history of hysterectomy (for fibroids, age 41), and appendectomy. I appreciate your assistance in Ms. Robertson's care  and look forward to your findings and recommendations. Sincerely, Destiny Ferris MD 
 
 No

## 2019-05-01 ENCOUNTER — HOSPITAL ENCOUNTER (OUTPATIENT)
Dept: CT IMAGING | Age: 59
Discharge: HOME | End: 2019-05-01
Payer: SELF-PAY

## 2019-05-01 DIAGNOSIS — Z00.00 PREVENTATIVE HEALTH CARE: ICD-10-CM

## 2019-05-01 PROCEDURE — 75571 CT HRT W/O DYE W/CA TEST: CPT

## 2019-05-06 NOTE — CARDIO/PULMONARY
Reached patient's mobile voicemail. Left a voice message with her coronary artery CT score of zero, a brief description of the meaning of this score, and my phone number in case of any questions.